# Patient Record
Sex: FEMALE | Race: WHITE | NOT HISPANIC OR LATINO | Employment: PART TIME | URBAN - METROPOLITAN AREA
[De-identification: names, ages, dates, MRNs, and addresses within clinical notes are randomized per-mention and may not be internally consistent; named-entity substitution may affect disease eponyms.]

---

## 2017-07-29 ENCOUNTER — OFFICE VISIT (OUTPATIENT)
Dept: URGENT CARE | Facility: CLINIC | Age: 52
End: 2017-07-29
Payer: COMMERCIAL

## 2017-07-29 VITALS
HEART RATE: 82 BPM | WEIGHT: 215 LBS | DIASTOLIC BLOOD PRESSURE: 76 MMHG | OXYGEN SATURATION: 98 % | SYSTOLIC BLOOD PRESSURE: 128 MMHG | TEMPERATURE: 97 F

## 2017-07-29 DIAGNOSIS — T22.111A BURN OF FIRST DEGREE OF RIGHT FOREARM, INITIAL ENCOUNTER: Primary | ICD-10-CM

## 2017-07-29 DIAGNOSIS — T22.212A BURN OF SECOND DEGREE OF LEFT FOREARM, INITIAL ENCOUNTER: ICD-10-CM

## 2017-07-29 PROCEDURE — 99203 OFFICE O/P NEW LOW 30 MIN: CPT | Mod: S$GLB,,, | Performed by: INTERNAL MEDICINE

## 2017-07-29 RX ORDER — NAPROXEN 500 MG/1
500 TABLET ORAL 2 TIMES DAILY WITH MEALS
Qty: 25 TABLET | Refills: 0 | Status: SHIPPED | OUTPATIENT
Start: 2017-07-29 | End: 2017-07-29 | Stop reason: SDUPTHER

## 2017-07-29 RX ORDER — SILVER SULFADIAZINE 10 G/1000G
CREAM TOPICAL 2 TIMES DAILY
Qty: 50 G | Refills: 0 | Status: SHIPPED | OUTPATIENT
Start: 2017-07-29 | End: 2017-07-29 | Stop reason: SDUPTHER

## 2017-07-29 RX ORDER — HYDROGEN PEROXIDE 3 %
20 SOLUTION, NON-ORAL MISCELLANEOUS
COMMUNITY

## 2017-07-29 RX ORDER — SILVER SULFADIAZINE 10 G/1000G
CREAM TOPICAL 2 TIMES DAILY
Qty: 50 G | Refills: 0 | Status: SHIPPED | OUTPATIENT
Start: 2017-07-29

## 2017-07-29 RX ORDER — CITALOPRAM 10 MG/1
10 TABLET ORAL DAILY
COMMUNITY

## 2017-07-29 RX ORDER — NAPROXEN 500 MG/1
500 TABLET ORAL 2 TIMES DAILY WITH MEALS
Qty: 25 TABLET | Refills: 0 | Status: SHIPPED | OUTPATIENT
Start: 2017-07-29 | End: 2017-08-04
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
## 2017-07-29 NOTE — PATIENT INSTRUCTIONS
First- and Second-Degree Burns  A burn occurs when skin is exposed to too much heat, sun, or harsh chemicals. A first-degree burn (superficial burn) causes only redness, like a sunburn. It heals in a few days. A second-degree burn (partial-thickness burn) is deeper and causes a blister to form. This may take up to 2 weeks to heal.  Home care  Follow these guidelines when caring for yourself at home:  · On the first day, you may put a small towel soaked in cool water (cool compress) on the burn to relieve severe pain.  · If a bandage was put on, change it once a day, unless you were told otherwise. If the bandage sticks, soak it off under warm running water.  · Before changing a bandage, wash your hands. Then wash the area with soap and water to remove any cream, ointment, ooze, or scab. You may do this in a sink, under a tub faucet, or in the shower. Rinse off the soap and pat the area dry with a clean towel. Look for signs of infection listed below.  · Put on any prescribed cream or ointment to prevent infection. This also keeps the bandage from sticking.  · Cover the burn with a nonstick gauze. Then wrap it with the bandage material.  · Change the bandage as soon as you can if it gets wet or dirty.  · Use acetaminophen or ibuprofen to control pain, unless another pain medicine was prescribed. If you have chronic liver or kidney disease, talk with your health care provider before using these medicines. Also talk with your provider if youve had a stomach ulcer or GI bleeding.  · Eat more calories and protein until the wound is healed.  · Wear a hat, sunscreen, and long sleeves while in the sun to protect the skin.  · Dont pick or scratch at the affected areas.  · Wear loose-fitting clothing.  Follow-up care  Follow up with your health care provider, or as advised. Most burns heal without becoming infected. Sometimes an infection may occur even with proper treatment. Be sure to check the burn daily for the signs  of infection listed below.  When to seek medical advice  Call your health care provider right away if any of these signs of infection occur:  · Pain in the wound gets worse  · Redness or swelling gets worse  · Pus comes from the wound  · Red streaks in your skin come from the burn  · Fever of 100.4º F (38º C) or higher, or as directed by your health care provider  · Wounds dont appear to be healing  · Nausea or vomiting   Date Last Reviewed: 10/27/2014  © 4461-6901 [employer]. [address] [place] [id number]. All rights reserved. This information is not intended as a substitute for professional medical care. Always follow your healthcare professional's instructions.    Please return here or go to the Emergency Department for any concerns or worsening of condition.  If you were prescribed antibiotics, please take them to completion.  If you were prescribed a narcotic medication, do not drive or operate heavy equipment or machinery while taking these medications.  Please follow up with your primary care doctor or specialist as needed.    If you  smoke, please stop smoking.